# Patient Record
Sex: MALE | Race: NATIVE HAWAIIAN OR OTHER PACIFIC ISLANDER
[De-identification: names, ages, dates, MRNs, and addresses within clinical notes are randomized per-mention and may not be internally consistent; named-entity substitution may affect disease eponyms.]

---

## 2022-08-11 ENCOUNTER — HOSPITAL ENCOUNTER (EMERGENCY)
Dept: HOSPITAL 26 - MED | Age: 57
Discharge: HOME | End: 2022-08-11
Payer: MEDICAID

## 2022-08-11 VITALS — SYSTOLIC BLOOD PRESSURE: 144 MMHG | DIASTOLIC BLOOD PRESSURE: 78 MMHG

## 2022-08-11 VITALS — DIASTOLIC BLOOD PRESSURE: 70 MMHG | SYSTOLIC BLOOD PRESSURE: 132 MMHG

## 2022-08-11 VITALS — BODY MASS INDEX: 28.93 KG/M2 | HEIGHT: 66 IN | WEIGHT: 180 LBS

## 2022-08-11 DIAGNOSIS — G47.00: Primary | ICD-10-CM

## 2022-08-11 DIAGNOSIS — Z90.49: ICD-10-CM

## 2022-08-11 NOTE — NUR
Patient discharged with v/s stable. Written and verbal after care instructions 
given and explained. Patient alert, oriented and verbalized understanding of 
instructions. Ambulatory with steady gait. All questions addressed prior to 
discharge. ID band removed. Patient advised to follow up with PMD. Rx of 
BENADRYL given. Patient educated on indication of medication including possible 
reaction and side effects. Opportunity to ask questions provided and answered. 
VSS, A/OX4, UNLABORED BREATHING, AMBULATORY, AND CALM DEMEANOR.

## 2022-08-11 NOTE — NUR
57 Y/O MALE BIBS FROM HOME, C/O INSOMNIA X4 DAYS. PT STATES THAT WHEN HE LAYS 
DOWN HE IS STILL UNABLE TO SLEEP. PT DENIES N/V/D, COUGH, CP, SOB, OR 
FEVER/CHILLS. SKIN IS PINK/EARM/DRY. A/OX4, ULABORED BREATHING, AMBULATORY. 
DENIES RECEREATIONAL DRUGS OR ALCOHOL. NO TRAUMA NOTED. PT IS SPEAKING IN FULL 
SENTENCES. 



DENIES PMH/RX

NKDA